# Patient Record
Sex: FEMALE | Race: WHITE | NOT HISPANIC OR LATINO | Employment: OTHER | URBAN - METROPOLITAN AREA
[De-identification: names, ages, dates, MRNs, and addresses within clinical notes are randomized per-mention and may not be internally consistent; named-entity substitution may affect disease eponyms.]

---

## 2021-11-09 ENCOUNTER — OFFICE VISIT (OUTPATIENT)
Dept: SURGERY | Facility: CLINIC | Age: 61
End: 2021-11-09

## 2021-11-09 ENCOUNTER — PREP FOR SURGERY (OUTPATIENT)
Dept: OTHER | Facility: HOSPITAL | Age: 61
End: 2021-11-09

## 2021-11-09 VITALS
WEIGHT: 201 LBS | OXYGEN SATURATION: 99 % | DIASTOLIC BLOOD PRESSURE: 92 MMHG | BODY MASS INDEX: 32.3 KG/M2 | SYSTOLIC BLOOD PRESSURE: 149 MMHG | TEMPERATURE: 98.2 F | HEART RATE: 81 BPM | HEIGHT: 66 IN

## 2021-11-09 DIAGNOSIS — K21.9 GASTROESOPHAGEAL REFLUX DISEASE, UNSPECIFIED WHETHER ESOPHAGITIS PRESENT: Primary | ICD-10-CM

## 2021-11-09 DIAGNOSIS — K21.9 GERD WITHOUT ESOPHAGITIS: Primary | ICD-10-CM

## 2021-11-09 PROCEDURE — 99204 OFFICE O/P NEW MOD 45 MIN: CPT | Performed by: SURGERY

## 2021-11-09 RX ORDER — LANOLIN ALCOHOL/MO/W.PET/CERES
1000 CREAM (GRAM) TOPICAL DAILY
COMMUNITY

## 2021-11-09 RX ORDER — TESTOSTERONE 20.25 MG/1.25G
GEL TOPICAL
COMMUNITY

## 2021-11-09 RX ORDER — GUAIFENESIN/EPHEDRINE HCL 200-12.5MG
TABLET ORAL
COMMUNITY

## 2021-11-09 RX ORDER — LEVOCETIRIZINE DIHYDROCHLORIDE 5 MG/1
TABLET, FILM COATED ORAL
COMMUNITY

## 2021-11-09 RX ORDER — SODIUM CHLORIDE 9 MG/ML
100 INJECTION, SOLUTION INTRAVENOUS CONTINUOUS
Status: CANCELLED | OUTPATIENT
Start: 2021-11-09

## 2021-11-09 RX ORDER — ESZOPICLONE 3 MG/1
3 TABLET, FILM COATED ORAL NIGHTLY
COMMUNITY

## 2021-11-09 RX ORDER — FLUTICASONE PROPIONATE 50 MCG
2 SPRAY, SUSPENSION (ML) NASAL DAILY
COMMUNITY

## 2021-11-09 RX ORDER — AMLODIPINE BESYLATE 10 MG/1
10 TABLET ORAL
COMMUNITY

## 2021-11-09 RX ORDER — LOSARTAN POTASSIUM 100 MG/1
100 TABLET ORAL
COMMUNITY

## 2021-11-09 RX ORDER — POLYETHYLENE GLYCOL 3350 17 G/17G
17 POWDER, FOR SOLUTION ORAL DAILY PRN
COMMUNITY

## 2021-11-09 RX ORDER — LEVOTHYROXINE AND LIOTHYRONINE 57; 13.5 UG/1; UG/1
120 TABLET ORAL
COMMUNITY

## 2021-11-09 RX ORDER — DEXLANSOPRAZOLE 60 MG/1
60 CAPSULE, DELAYED RELEASE ORAL
COMMUNITY

## 2021-11-09 RX ORDER — OMEGA-3-ACID ETHYL ESTERS 1 G/1
4 CAPSULE, LIQUID FILLED ORAL EVERY 24 HOURS
COMMUNITY

## 2021-11-09 RX ORDER — UBIDECARENONE 100 MG
100 CAPSULE ORAL DAILY
COMMUNITY

## 2021-11-09 RX ORDER — TRAZODONE HYDROCHLORIDE 100 MG/1
200 TABLET ORAL NIGHTLY
COMMUNITY

## 2021-11-09 RX ORDER — PROGESTERONE 50 MG/ML
INJECTION, SOLUTION INTRAMUSCULAR EVERY 24 HOURS
COMMUNITY

## 2021-11-09 RX ORDER — GABAPENTIN 100 MG/1
100 CAPSULE ORAL 3 TIMES DAILY PRN
COMMUNITY

## 2021-11-09 RX ORDER — MULTIVIT WITH MINERALS/LUTEIN
1000 TABLET ORAL 2 TIMES DAILY
COMMUNITY

## 2021-11-09 RX ORDER — FUROSEMIDE 20 MG/1
20 TABLET ORAL EVERY 24 HOURS
COMMUNITY

## 2021-11-09 RX ORDER — SUCRALFATE 1 G/1
1 TABLET ORAL 4 TIMES DAILY
COMMUNITY

## 2021-11-09 NOTE — H&P
Subjective   Ginny Dunaway is a 61 y.o. female.     History of present illness  Esmer is a pleasant 61-year-old seen in the office today at the request of Dr. Louis her primary care physician in [place] for symptomatic reflux disease is not being controlled with her current medications.  She has had reflux for 20+ years.  He states I first did her EGD back 25 years ago in [place].  She is currently on Dexilant and it helps but does not completely alleviate her symptoms.  She is considering surgery as a way to be able to get off medication.  Her last scope was done by Dr. Santos at [facility] in January 2021.  There was no mention then of a hiatal hernia.    In the office today we have discussed reflux disease in detail.  We have gone over a booklet outlining reflux and how we treated.  We have also talked about transoral fundoplication and whether she had a hernia or not the differences.  We recommended that she consider repeating an EGD with dilatation to see if she is a candidate for transoral fundoplication.  We have answered all her questions today we have drawn pictures about the TIF.  She wants to proceed with an EGD for further evaluation.    Past Medical History:   Diagnosis Date   • GERD (gastroesophageal reflux disease)        Past Surgical History:   Procedure Laterality Date   • HYSTERECTOMY     • LATERAL EPICONDYLE RELEASE         Outpatient Encounter Medications as of 11/9/2021   Medication Sig Dispense Refill   • 5-Hydroxytryptophan (5-HTP) 100 MG capsule 5-HTP   1 daily     • amLODIPine (NORVASC) 10 MG tablet Take  by mouth.     • Calcium Carb-Cholecalciferol (Calcium 1000 + D) 1000-800 MG-UNIT tablet calcium   600mg - two tablets twice daily     • coenzyme Q10 100 MG capsule Take 100 mg by mouth Daily.     • conjugated estrogens in sodium chloride 0.9 % 50 mL IVPB Daily.     • dexlansoprazole (Dexilant) 60 MG capsule Daily.     • Emollient (DHEA) 1 % cream DHEA   10mg two daily     •  eszopiclone (Lunesta) 3 MG tablet Take 3 mg by mouth Every Night. Take immediately before bedtime     • fluticasone (FLONASE) 50 MCG/ACT nasal spray 2 sprays into the nostril(s) as directed by provider Daily.     • furosemide (Lasix) 20 MG tablet Daily.     • gabapentin (NEURONTIN) 100 MG capsule Take 100 mg by mouth 3 (Three) Times a Day.     • Ibuprofen 800 MG/200ML solution 1 tab(s)     • levocetirizine (Xyzal) 5 MG tablet 1 tab(s)     • losartan (COZAAR) 100 MG tablet Daily.     • Melatonin 10 MG/ML liquid Daily.     • Multiple Vitamins-Minerals (MULTIVITAMIN ADULT EXTRA C PO) multivitamin   one daily     • omega-3 acid ethyl esters (Lovaza) 1 g capsule Daily.     • polyethylene glycol (MiraLax) 17 g packet 17 g     • progesterone oil 50 MG/ML injection Daily.     • sucralfate (CARAFATE) 1 g tablet Take 1 g by mouth 4 (Four) Times a Day.     • Testosterone 1.62 % gel 1 pump(s)     • Thyroid (ARMOUR THYROID) 90 MG PO tablet Daily.     • traZODone (DESYREL) 100 MG tablet Take 100 mg by mouth 2 (Two) Times a Day.     • vitamin B-12 (CYANOCOBALAMIN) 1000 MCG tablet Take 1,000 mcg by mouth 5 (Five) Times a Day.     • vitamin C (ASCORBIC ACID) 250 MG tablet Take 250 mg by mouth Daily.       No facility-administered encounter medications on file as of 11/9/2021.       Allergies   Allergen Reactions   • Bactrim [Sulfamethoxazole-Trimethoprim] Dizziness   • Lisinopril Cough       Family History   Problem Relation Age of Onset   • Cancer Mother    • Hypertension Mother    • Hypertension Father        Social History     Socioeconomic History   • Marital status:    Tobacco Use   • Smoking status: Never Smoker   • Smokeless tobacco: Never Used   Substance and Sexual Activity   • Alcohol use: Defer   • Drug use: Defer   • Sexual activity: Defer       The following portions of the patient's history were reviewed and updated as appropriate: allergies, current medications, past family history, past medical history, past  social history, past surgical history and problem list.    Objective   Complete review of systems is done and unremarkable with exception the chief complaint.    Physical exam shows pleasant 61-year-old female.  HEENT is negative.  Heart regular.  Lungs clear.  Abdomen soft nontender without mass.  Extremities equal range of motion and usage.  Neuro with no obvious focal deficit.    Impression: 61-year-old with 20+ year history of severe reflux disease refractory to medical treatment    Recommendation: EGD with dilatation to assess her suitability for transoral fundoplication    Assessment/Plan   There are no diagnoses linked to this encounter.               Liam Robertson DO  11/9/2021  11:24 EST

## 2021-11-09 NOTE — PROGRESS NOTES
Subjective   Ginny Dunaway is a 61 y.o. female.     History of present illness  Esmer is a pleasant 61-year-old seen in the office today at the request of Dr. Louis her primary care physician in [place] for symptomatic reflux disease is not being controlled with her current medications.  She has had reflux for 20+ years.  He states I first did her EGD back 25 years ago in [place].  She is currently on Dexilant and it helps but does not completely alleviate her symptoms.  She is considering surgery as a way to be able to get off medication.  Her last scope was done by Dr. Santos at [facility] in January 2021.  There was no mention then of a hiatal hernia.    In the office today we have discussed reflux disease in detail.  We have gone over a booklet outlining reflux and how we treated.  We have also talked about transoral fundoplication and whether she had a hernia or not the differences.  We recommended that she consider repeating an EGD with dilatation to see if she is a candidate for transoral fundoplication.  We have answered all her questions today we have drawn pictures about the TIF.  She wants to proceed with an EGD for further evaluation.    Past Medical History:   Diagnosis Date   • GERD (gastroesophageal reflux disease)        Past Surgical History:   Procedure Laterality Date   • HYSTERECTOMY     • LATERAL EPICONDYLE RELEASE         Outpatient Encounter Medications as of 11/9/2021   Medication Sig Dispense Refill   • 5-Hydroxytryptophan (5-HTP) 100 MG capsule 5-HTP   1 daily     • amLODIPine (NORVASC) 10 MG tablet Take  by mouth.     • Calcium Carb-Cholecalciferol (Calcium 1000 + D) 1000-800 MG-UNIT tablet calcium   600mg - two tablets twice daily     • coenzyme Q10 100 MG capsule Take 100 mg by mouth Daily.     • conjugated estrogens in sodium chloride 0.9 % 50 mL IVPB Daily.     • dexlansoprazole (Dexilant) 60 MG capsule Daily.     • Emollient (DHEA) 1 % cream DHEA   10mg two daily     •  eszopiclone (Lunesta) 3 MG tablet Take 3 mg by mouth Every Night. Take immediately before bedtime     • fluticasone (FLONASE) 50 MCG/ACT nasal spray 2 sprays into the nostril(s) as directed by provider Daily.     • furosemide (Lasix) 20 MG tablet Daily.     • gabapentin (NEURONTIN) 100 MG capsule Take 100 mg by mouth 3 (Three) Times a Day.     • Ibuprofen 800 MG/200ML solution 1 tab(s)     • levocetirizine (Xyzal) 5 MG tablet 1 tab(s)     • losartan (COZAAR) 100 MG tablet Daily.     • Melatonin 10 MG/ML liquid Daily.     • Multiple Vitamins-Minerals (MULTIVITAMIN ADULT EXTRA C PO) multivitamin   one daily     • omega-3 acid ethyl esters (Lovaza) 1 g capsule Daily.     • polyethylene glycol (MiraLax) 17 g packet 17 g     • progesterone oil 50 MG/ML injection Daily.     • sucralfate (CARAFATE) 1 g tablet Take 1 g by mouth 4 (Four) Times a Day.     • Testosterone 1.62 % gel 1 pump(s)     • Thyroid (ARMOUR THYROID) 90 MG PO tablet Daily.     • traZODone (DESYREL) 100 MG tablet Take 100 mg by mouth 2 (Two) Times a Day.     • vitamin B-12 (CYANOCOBALAMIN) 1000 MCG tablet Take 1,000 mcg by mouth 5 (Five) Times a Day.     • vitamin C (ASCORBIC ACID) 250 MG tablet Take 250 mg by mouth Daily.       No facility-administered encounter medications on file as of 11/9/2021.       Allergies   Allergen Reactions   • Bactrim [Sulfamethoxazole-Trimethoprim] Dizziness   • Lisinopril Cough       Family History   Problem Relation Age of Onset   • Cancer Mother    • Hypertension Mother    • Hypertension Father        Social History     Socioeconomic History   • Marital status:    Tobacco Use   • Smoking status: Never Smoker   • Smokeless tobacco: Never Used   Substance and Sexual Activity   • Alcohol use: Defer   • Drug use: Defer   • Sexual activity: Defer       The following portions of the patient's history were reviewed and updated as appropriate: allergies, current medications, past family history, past medical history, past  social history, past surgical history and problem list.    Objective   Complete review of systems is done and unremarkable with exception the chief complaint.    Physical exam shows pleasant 61-year-old female.  HEENT is negative.  Heart regular.  Lungs clear.  Abdomen soft nontender without mass.  Extremities equal range of motion and usage.  Neuro with no obvious focal deficit.    Impression: 61-year-old with 20+ year history of severe reflux disease refractory to medical treatment    Recommendation: EGD with dilatation to assess her suitability for transoral fundoplication    Assessment/Plan   There are no diagnoses linked to this encounter.               Liam Robertson DO  11/9/2021  11:21 EST

## 2021-12-06 ENCOUNTER — TELEPHONE (OUTPATIENT)
Dept: SURGERY | Facility: CLINIC | Age: 61
End: 2021-12-06

## 2021-12-06 NOTE — TELEPHONE ENCOUNTER
Caller: RUPESH LEIVA    Relationship to patient: SELF  Best call back number: [phone]    Chief complaint: COVID-19 EXPOSURE    Type of visit: PAT AND EGD    Requested date: NEXT R/S DATE AVAILABLE 14 DAYS OUT.     If rescheduling, when is the original appointment: 12/8/21    Additional notes:PT HAS BEEN EXPOSED TO COVID-19 AND NEEDS TO CANCEL PAT AND WISHES TO RESCHEDULE EGD WITH .

## 2022-01-11 ENCOUNTER — LAB (OUTPATIENT)
Dept: LAB | Facility: HOSPITAL | Age: 62
End: 2022-01-11

## 2022-01-11 DIAGNOSIS — K21.9 GERD WITHOUT ESOPHAGITIS: ICD-10-CM

## 2022-01-11 LAB
ALBUMIN SERPL-MCNC: 4.9 G/DL (ref 3.5–5.2)
ALBUMIN/GLOB SERPL: 2 G/DL
ALP SERPL-CCNC: 66 U/L (ref 39–117)
ALT SERPL W P-5'-P-CCNC: 16 U/L (ref 1–33)
ANION GAP SERPL CALCULATED.3IONS-SCNC: 9.3 MMOL/L (ref 5–15)
AST SERPL-CCNC: 15 U/L (ref 1–32)
BASOPHILS # BLD AUTO: 0.05 10*3/MM3 (ref 0–0.2)
BASOPHILS NFR BLD AUTO: 0.9 % (ref 0–1.5)
BILIRUB SERPL-MCNC: 0.3 MG/DL (ref 0–1.2)
BUN SERPL-MCNC: 19 MG/DL (ref 8–23)
BUN/CREAT SERPL: 20.7 (ref 7–25)
CALCIUM SPEC-SCNC: 9.4 MG/DL (ref 8.6–10.5)
CHLORIDE SERPL-SCNC: 102 MMOL/L (ref 98–107)
CO2 SERPL-SCNC: 29.7 MMOL/L (ref 22–29)
CREAT SERPL-MCNC: 0.92 MG/DL (ref 0.57–1)
DEPRECATED RDW RBC AUTO: 41.3 FL (ref 37–54)
EOSINOPHIL # BLD AUTO: 0 10*3/MM3 (ref 0–0.4)
EOSINOPHIL NFR BLD AUTO: 0 % (ref 0.3–6.2)
ERYTHROCYTE [DISTWIDTH] IN BLOOD BY AUTOMATED COUNT: 13.3 % (ref 12.3–15.4)
GFR SERPL CREATININE-BSD FRML MDRD: 62 ML/MIN/1.73
GLOBULIN UR ELPH-MCNC: 2.4 GM/DL
GLUCOSE SERPL-MCNC: 68 MG/DL (ref 65–99)
HCT VFR BLD AUTO: 40.6 % (ref 34–46.6)
HGB BLD-MCNC: 13.8 G/DL (ref 12–15.9)
IMM GRANULOCYTES # BLD AUTO: 0.03 10*3/MM3 (ref 0–0.05)
IMM GRANULOCYTES NFR BLD AUTO: 0.5 % (ref 0–0.5)
LYMPHOCYTES # BLD AUTO: 1.86 10*3/MM3 (ref 0.7–3.1)
LYMPHOCYTES NFR BLD AUTO: 33.5 % (ref 19.6–45.3)
MCH RBC QN AUTO: 29.3 PG (ref 26.6–33)
MCHC RBC AUTO-ENTMCNC: 34 G/DL (ref 31.5–35.7)
MCV RBC AUTO: 86.2 FL (ref 79–97)
MONOCYTES # BLD AUTO: 0.49 10*3/MM3 (ref 0.1–0.9)
MONOCYTES NFR BLD AUTO: 8.8 % (ref 5–12)
NEUTROPHILS NFR BLD AUTO: 3.12 10*3/MM3 (ref 1.7–7)
NEUTROPHILS NFR BLD AUTO: 56.3 % (ref 42.7–76)
NRBC BLD AUTO-RTO: 0 /100 WBC (ref 0–0.2)
PLATELET # BLD AUTO: 173 10*3/MM3 (ref 140–450)
PMV BLD AUTO: 9.7 FL (ref 6–12)
POTASSIUM SERPL-SCNC: 4.1 MMOL/L (ref 3.5–5.2)
PROT SERPL-MCNC: 7.3 G/DL (ref 6–8.5)
RBC # BLD AUTO: 4.71 10*6/MM3 (ref 3.77–5.28)
SODIUM SERPL-SCNC: 141 MMOL/L (ref 136–145)
WBC NRBC COR # BLD: 5.55 10*3/MM3 (ref 3.4–10.8)

## 2022-01-11 PROCEDURE — 80053 COMPREHEN METABOLIC PANEL: CPT

## 2022-01-11 PROCEDURE — 85025 COMPLETE CBC W/AUTO DIFF WBC: CPT

## 2022-01-11 PROCEDURE — U0004 COV-19 TEST NON-CDC HGH THRU: HCPCS

## 2022-01-11 PROCEDURE — 36415 COLL VENOUS BLD VENIPUNCTURE: CPT

## 2022-01-11 PROCEDURE — U0005 INFEC AGEN DETEC AMPLI PROBE: HCPCS

## 2022-01-11 PROCEDURE — C9803 HOPD COVID-19 SPEC COLLECT: HCPCS

## 2022-01-12 LAB — SARS-COV-2 ORF1AB RESP QL NAA+PROBE: NOT DETECTED

## 2022-01-13 ENCOUNTER — ANESTHESIA (OUTPATIENT)
Dept: GASTROENTEROLOGY | Facility: HOSPITAL | Age: 62
End: 2022-01-13

## 2022-01-13 ENCOUNTER — HOSPITAL ENCOUNTER (OUTPATIENT)
Facility: HOSPITAL | Age: 62
Setting detail: HOSPITAL OUTPATIENT SURGERY
Discharge: HOME | End: 2022-01-13
Attending: SURGERY | Admitting: SURGERY

## 2022-01-13 ENCOUNTER — ANESTHESIA EVENT (OUTPATIENT)
Dept: GASTROENTEROLOGY | Facility: HOSPITAL | Age: 62
End: 2022-01-13

## 2022-01-13 VITALS
RESPIRATION RATE: 16 BRPM | WEIGHT: 214.4 LBS | SYSTOLIC BLOOD PRESSURE: 140 MMHG | BODY MASS INDEX: 34.46 KG/M2 | OXYGEN SATURATION: 94 % | HEIGHT: 66 IN | HEART RATE: 69 BPM | TEMPERATURE: 97.7 F | DIASTOLIC BLOOD PRESSURE: 80 MMHG

## 2022-01-13 DIAGNOSIS — K21.9 GERD WITHOUT ESOPHAGITIS: ICD-10-CM

## 2022-01-13 PROCEDURE — 43235 EGD DIAGNOSTIC BRUSH WASH: CPT | Performed by: SURGERY

## 2022-01-13 PROCEDURE — 43450 DILATE ESOPHAGUS 1/MULT PASS: CPT | Performed by: SURGERY

## 2022-01-13 PROCEDURE — 25010000002 PROPOFOL 200 MG/20ML EMULSION: Performed by: ANESTHESIOLOGY

## 2022-01-13 RX ORDER — PROPOFOL 10 MG/ML
INJECTION, EMULSION INTRAVENOUS AS NEEDED
Status: DISCONTINUED | OUTPATIENT
Start: 2022-01-13 | End: 2022-01-13 | Stop reason: SURG

## 2022-01-13 RX ORDER — SODIUM CHLORIDE, SODIUM LACTATE, POTASSIUM CHLORIDE, CALCIUM CHLORIDE 600; 310; 30; 20 MG/100ML; MG/100ML; MG/100ML; MG/100ML
INJECTION, SOLUTION INTRAVENOUS CONTINUOUS PRN
Status: DISCONTINUED | OUTPATIENT
Start: 2022-01-13 | End: 2022-01-13 | Stop reason: SURG

## 2022-01-13 RX ORDER — LIDOCAINE HYDROCHLORIDE 20 MG/ML
INJECTION, SOLUTION EPIDURAL; INFILTRATION; INTRACAUDAL; PERINEURAL AS NEEDED
Status: DISCONTINUED | OUTPATIENT
Start: 2022-01-13 | End: 2022-01-13 | Stop reason: SURG

## 2022-01-13 RX ORDER — SODIUM CHLORIDE 9 MG/ML
100 INJECTION, SOLUTION INTRAVENOUS CONTINUOUS
Status: DISCONTINUED | OUTPATIENT
Start: 2022-01-13 | End: 2022-01-13 | Stop reason: HOSPADM

## 2022-01-13 RX ORDER — METOCLOPRAMIDE 5 MG/1
TABLET ORAL
Qty: 90 TABLET | Refills: 1 | Status: SHIPPED | OUTPATIENT
Start: 2022-01-13 | End: 2022-05-19

## 2022-01-13 RX ORDER — ONDANSETRON 2 MG/ML
4 INJECTION INTRAMUSCULAR; INTRAVENOUS ONCE AS NEEDED
Status: DISCONTINUED | OUTPATIENT
Start: 2022-01-13 | End: 2022-01-13 | Stop reason: HOSPADM

## 2022-01-13 RX ADMIN — LIDOCAINE HYDROCHLORIDE 100 MG: 20 INJECTION, SOLUTION EPIDURAL; INFILTRATION; INTRACAUDAL; PERINEURAL at 07:30

## 2022-01-13 RX ADMIN — PROPOFOL 100 MG: 10 INJECTION, EMULSION INTRAVENOUS at 07:36

## 2022-01-13 RX ADMIN — PROPOFOL 100 MG: 10 INJECTION, EMULSION INTRAVENOUS at 07:31

## 2022-01-13 RX ADMIN — SODIUM CHLORIDE, SODIUM LACTATE, POTASSIUM CHLORIDE, AND CALCIUM CHLORIDE: .6; .31; .03; .02 INJECTION, SOLUTION INTRAVENOUS at 07:28

## 2022-01-13 RX ADMIN — SODIUM CHLORIDE 100 ML/HR: 0.9 INJECTION, SOLUTION INTRAVENOUS at 06:36

## 2022-01-13 NOTE — OP NOTE
ESOPHAGOGASTRODUODENOSCOPY WITH DILATATION  Procedure Report    Patient Name:  Ginny Hollins  YOB: 1960    Date of Surgery:  1/13/2022     Indications: Severe GE reflux    Pre-op Diagnosis:   GERD without esophagitis [K21.9]       Post-Op Diagnosis Codes:     * GERD without esophagitis [K21.9], gastroparesis with retained food, 2 cm hiatal hernia, severe reflux with irregular Z-line    Procedure/CPT® Codes:      Procedure(s):  ESOPHAGOGASTRODUODENOSCOPY WITH DILATATION (bougie #48,#50,#52,#54,#56,#58)    Staff:  Surgeon(s):  Liam Robertson DO         Anesthesia: General    Anesthetist: Dr. Bibi Robertson    Estimated Blood Loss: none    Implants:    Nothing was implanted during the procedure    Specimen:          None        Findings: Sliding type I hiatal hernia, 2 cm, retained food in the stomach consistent with gastroparesis    Complications: None    Description of Procedure: Ms. Hollins is a pleasant 61-year-old female seen in the office at the request of her primary care physician Dr. Dalila Louis in [place] for lifelong reflux.  It scoped her about 25 years ago and at that time she elected to stay on medication.  She has been on all the proton pump inhibitors.  She is currently on Dexilant which does help her symptoms but she still continues to have regurgitation and reflux issues.  Her last scope was done by Dr. Santos at [facility] in [place] a year ago.  She states that it was essentially unremarkable as she recalls.  She is considering a transoral fundoplication and saw us in the office for more information concerning that I suggested she be rescoped and dilated to make sure she would be a candidate for that procedure.  For that reason she presents today.    Patient was taken to the endoscopy suite procedure room #1 placed in the left lateral decub position and IV sedation given by Dr. Bibi Robertson.  Timeout was done and identity verified.  The Olympus upper Pan  videoscope was passed by the cricopharyngeus into the esophagus stomach and duodenum.  Immediately upon entering the stomach we noted large amount of retained food.  We were able to juliet through that and get into the duodenum.  Duodenum was essentially unremarkable.  Scope was withdrawn back into the stomach and our visualization of the stomach was markedly limited.  We could not rule out the presence of any polyp or tumor.  The wall certainly insufflated and decompressed normally we were able to retroflex the scope and view the EG junction which does show a small 2 cm hernia.  Scope was then withdrawn back to the distal esophagus the Z-line is about 38 to 39 cm from the incisors.  It is irregular consistent with reflux but there is no true Tilley's.  The remainder the esophageal mucosa is unremarkable on the way out.  We then sequentially dilated her from 48-58 Hungarian with Dumont type dilators without difficulty.  She tolerated the procedure well.  She is transferred back to recovery area in satisfactory condition.        Liam Robertson,      Date: 1/13/2022  Time: 07:38 EST

## 2022-01-13 NOTE — ANESTHESIA PREPROCEDURE EVALUATION
Anesthesia Evaluation     Patient summary reviewed and Nursing notes reviewed   no history of anesthetic complications:  NPO Solid Status: > 8 hours  NPO Liquid Status: > 8 hours           Airway   Mallampati: II  TM distance: >3 FB  Neck ROM: full  No difficulty expected  Dental      Pulmonary     breath sounds clear to auscultation  (+) sleep apnea on CPAP,   Cardiovascular     ECG reviewed  Rhythm: regular  Rate: normal    (+) hypertension,       Neuro/Psych- negative ROS  GI/Hepatic/Renal/Endo    (+) obesity,  GERD,      Musculoskeletal (-) negative ROS    Abdominal     Abdomen: soft.   Substance History - negative use     OB/GYN negative ob/gyn ROS         Other - negative ROS                       Anesthesia Plan    ASA 2     MAC     intravenous induction     Anesthetic plan, all risks, benefits, and alternatives have been provided, discussed and informed consent has been obtained with: patient.

## 2022-01-13 NOTE — ANESTHESIA POSTPROCEDURE EVALUATION
Patient: Ginny Hollins    Procedure Summary     Date: 01/13/22 Room / Location: [facility] ENDOSCOPY 1 / [facility] ENDOSCOPY    Anesthesia Start: 0728 Anesthesia Stop: 0739    Procedure: ESOPHAGOGASTRODUODENOSCOPY WITH DILATATION (bougie #48,#50,#52,#54,#56,#58) (N/A ) Diagnosis:       GERD without esophagitis      (GERD without esophagitis [K21.9])    Surgeons: Liam Robertson DO Provider: Bibi Robertson MD    Anesthesia Type: MAC ASA Status: 2          Anesthesia Type: MAC    Vitals  Vitals Value Taken Time   /80 01/13/22 0805   Temp     Pulse 69 01/13/22 0805   Resp 16 01/13/22 0805   SpO2 94 % 01/13/22 0805           Post Anesthesia Care and Evaluation    Patient location during evaluation: PACU  Patient participation: complete - patient participated  Level of consciousness: awake  Pain management: adequate  Airway patency: patent  Anesthetic complications: No anesthetic complications  PONV Status: controlled  Cardiovascular status: acceptable  Respiratory status: acceptable  Hydration status: acceptable

## 2022-01-13 NOTE — DISCHARGE INSTRUCTIONS
A responsible adult should stay with you and you should rest quietly for the rest of the day.    Do not drink alcohol, drive, operate any heavy machinery or power tools or make any legal/important decisions for the next 24 hours.     Progress your diet as tolerated.  If you begin to experience severe pain, increased shortness of breath, racing heartbeat or a fever above 101 F, seek immediate medical attention.     Follow up with MD as instructed. Call office for results in 3 to 5 days if needed.    [phone]

## 2022-01-13 NOTE — H&P
Subjective   Ginny Hollins is a 61 y.o. female.     History of present illness  Ginny is a pleasant 61-year-old seen in the office at the request of her primary care physician Dr. Louis in [place] for severe reflux disease.  She has been bothered with this for 20+ years.  She currently is on Dexilant.  Her last EGD by me was about 25 years ago.  Her last EGD was done by Dr. Santos at [facility] in January last year.  There was no mention of the hiatal hernia.  In the office we discussed reflux disease in detail.  I have recommended that she undergo an EGD with dilatation to see if she is a candidate for a straight transoral fundoplication or whether she needs more intervention than that.  We discussed the procedure and risks.  She understands those accepts those wishes to proceed.    Past Medical History:   Diagnosis Date   • COVID-19    • GERD (gastroesophageal reflux disease)    • Hypertension    • Sleep apnea        Past Surgical History:   Procedure Laterality Date   • HYSTERECTOMY     • LATERAL EPICONDYLE RELEASE         [unfilled]    Allergies   Allergen Reactions   • Bactrim [Sulfamethoxazole-Trimethoprim] Dizziness   • Lisinopril Cough   • Prazosin Other (See Comments)       Family History   Problem Relation Age of Onset   • Cancer Mother    • Hypertension Mother    • Hypertension Father        Social History     Socioeconomic History   • Marital status:    Tobacco Use   • Smoking status: Never Smoker   • Smokeless tobacco: Never Used   Substance and Sexual Activity   • Alcohol use: Yes     Comment: occ   • Drug use: Defer   • Sexual activity: Defer       The following portions of the patient's history were reviewed and updated as appropriate: allergies, current medications, past family history, past medical history, past social history, past surgical history and problem list.    Objective   Complete review of systems was done and unremarkable with exception the chief  complaint.    Physical exam shows a pleasant 61-year-old female.  HEENT is negative.  Heart is regular.  Lungs are clear.  Abdomen is soft and nontender without mass.  Extremities show equal range of motion and usage.  Neuro with no obvious focal deficit.    Impression: Long history of GE reflux disease    Plan: EGD with dilatation    Assessment/Plan                  Liam Robertson, DO  1/13/2022  07:22 EST

## 2022-02-02 ENCOUNTER — OFFICE VISIT (OUTPATIENT)
Dept: SURGERY | Facility: CLINIC | Age: 62
End: 2022-02-02

## 2022-02-02 VITALS
WEIGHT: 216 LBS | OXYGEN SATURATION: 99 % | TEMPERATURE: 96.3 F | SYSTOLIC BLOOD PRESSURE: 151 MMHG | BODY MASS INDEX: 34.72 KG/M2 | HEIGHT: 66 IN | HEART RATE: 81 BPM | DIASTOLIC BLOOD PRESSURE: 93 MMHG

## 2022-02-02 DIAGNOSIS — K21.9 GASTROESOPHAGEAL REFLUX DISEASE WITHOUT ESOPHAGITIS: Primary | ICD-10-CM

## 2022-02-02 PROCEDURE — 99212 OFFICE O/P EST SF 10 MIN: CPT | Performed by: SURGERY

## 2022-02-02 NOTE — PROGRESS NOTES
Subjective   Ginny Hollins is a 61 y.o. female.     History of present illness  Ginny is seen in the office today to discuss the results of her EGD.  We scoped her for a long history of reflux disease.  We found that she has gastroparesis with retained food in the stomach.  We recommended that she try Reglan which after reading the side effects she decided not to do.  She is here today to discuss surgical treatment of her hiatal hernia and reflux.    In the office today we have gone over 3 brochures and drawn her pictures of lap hiatal hernia repair with transoral fundoplication.  We are able to dilate her to 58 Fij[place] easily so she would be a candidate for that.  However I have suggested that she try the Reglan for 2 weeks and her symptoms may be significantly improved.  If not then were happy to get her scheduled for surgery.    Past Medical History:   Diagnosis Date   • COVID-19    • GERD (gastroesophageal reflux disease)    • Hypertension    • Sleep apnea        Past Surgical History:   Procedure Laterality Date   • ENDOSCOPY N/A 1/13/2022    Procedure: ESOPHAGOGASTRODUODENOSCOPY WITH DILATATION (bougie #48,#50,#52,#54,#56,#58);  Surgeon: Liam Robertson DO;  Location: [facility] ENDOSCOPY;  Service: General;  Laterality: N/A;  Post: 2cm hiatal hernia   • HYSTERECTOMY     • LATERAL EPICONDYLE RELEASE         Outpatient Encounter Medications as of 2/2/2022   Medication Sig Dispense Refill   • 5-Hydroxytryptophan (5-HTP) 100 MG capsule 5-HTP   1 daily     • amLODIPine (NORVASC) 10 MG tablet Take  by mouth.     • Calcium Carb-Cholecalciferol (Calcium 1000 + D) 1000-800 MG-UNIT tablet calcium   600mg - two tablets twice daily     • coenzyme Q10 100 MG capsule Take 100 mg by mouth Daily.     • conjugated estrogens in sodium chloride 0.9 % 50 mL IVPB Daily.     • dexlansoprazole (Dexilant) 60 MG capsule Daily.     • DHEA 10 MG capsule Take  by mouth.     • Estradiol-Estriol-Progesterone (BIEST/PROGESTERONE TD)  Place 6 mg on the skin as directed by provider.     • eszopiclone (Lunesta) 3 MG tablet Take 3 mg by mouth Every Night. Take immediately before bedtime     • fluticasone (FLONASE) 50 MCG/ACT nasal spray 2 sprays into the nostril(s) as directed by provider Daily.     • furosemide (Lasix) 20 MG tablet Daily.     • gabapentin (NEURONTIN) 100 MG capsule Take 100 mg by mouth 3 (Three) Times a Day.     • Ibuprofen 800 MG/200ML solution 1 tab(s)     • levocetirizine (Xyzal) 5 MG tablet 1 tab(s)     • losartan (COZAAR) 100 MG tablet Daily.     • Magnesium 100 MG capsule Take  by mouth.     • Melatonin 10 MG/ML liquid Daily.     • metoclopramide (Reglan) 5 MG tablet Take 1 tablet 1/2hr before meals and 1 tablet at bedtime. 90 tablet 1   • Multiple Vitamins-Minerals (MULTIVITAMIN ADULT EXTRA C PO) multivitamin   one daily     • omega-3 acid ethyl esters (Lovaza) 1 g capsule Daily.     • polyethylene glycol (MiraLax) 17 g packet 17 g     • progesterone oil 50 MG/ML injection Daily.     • sucralfate (CARAFATE) 1 g tablet Take 1 g by mouth 4 (Four) Times a Day.     • Testosterone 1.62 % gel 1 pump(s)     • Thyroid (ARMOUR THYROID) 90 MG PO tablet Daily.     • traZODone (DESYREL) 100 MG tablet Take 100 mg by mouth 2 (Two) Times a Day.     • vitamin B-12 (CYANOCOBALAMIN) 1000 MCG tablet Take 1,000 mcg by mouth 5 (Five) Times a Day.     • vitamin C (ASCORBIC ACID) 250 MG tablet Take 250 mg by mouth Daily.     • [DISCONTINUED] Emollient (DHEA) 1 % cream DHEA   10mg two daily       No facility-administered encounter medications on file as of 2/2/2022.       Allergies   Allergen Reactions   • Bactrim [Sulfamethoxazole-Trimethoprim] Dizziness   • Lisinopril Cough   • Prazosin Other (See Comments)       Family History   Problem Relation Age of Onset   • Cancer Mother    • Hypertension Mother    • Hypertension Father        Social History     Socioeconomic History   • Marital status:    Tobacco Use   • Smoking status: Never Smoker    • Smokeless tobacco: Never Used   Substance and Sexual Activity   • Alcohol use: Yes     Comment: occ   • Drug use: Defer   • Sexual activity: Defer       The following portions of the patient's history were reviewed and updated as appropriate: allergies, current medications, past family history, past medical history, past social history, past surgical history and problem list.    Objective       Assessment/Plan   There are no diagnoses linked to this encounter.    Impression: Gastroparesis with 2 cm hernia and reflux    Plan: She will call us with a progress report in 2 weeks.  If she is not significantly improved then we would recommend lap hiatal hernia repair with transoral fundoplication.           Liam Robertson, DO  2/2/2022  13:58 EST

## 2022-02-07 ENCOUNTER — TELEPHONE (OUTPATIENT)
Dept: SURGERY | Facility: CLINIC | Age: 62
End: 2022-02-07

## 2022-02-08 ENCOUNTER — PREP FOR SURGERY (OUTPATIENT)
Dept: OTHER | Facility: HOSPITAL | Age: 62
End: 2022-02-08

## 2022-02-08 DIAGNOSIS — K21.9 GERD (GASTROESOPHAGEAL REFLUX DISEASE): Primary | ICD-10-CM

## 2022-02-08 RX ORDER — SODIUM CHLORIDE 9 MG/ML
100 INJECTION, SOLUTION INTRAVENOUS CONTINUOUS
Status: CANCELLED | OUTPATIENT
Start: 2022-02-08

## 2022-02-08 NOTE — H&P
Subjective   Ginny Hollins is a 61 y.o. female.     History of present illness  Ginny is seen in the office today to discuss the results of her EGD.  We scoped her for a long history of reflux disease.  We found that she has gastroparesis with retained food in the stomach.  We recommended that she try Reglan which after reading the side effects she decided not to do.  She is here today to discuss surgical treatment of her hiatal hernia and reflux.    In the office today we have gone over 3 brochures and drawn her pictures of lap hiatal hernia repair with transoral fundoplication.  We are able to dilate her to 58 Singaporean easily so she would be a candidate for that.  However I have suggested that she try the Reglan for 2 weeks and her symptoms may be significantly improved.  If not then were happy to get her scheduled for surgery.    Past Medical History:   Diagnosis Date   • COVID-19    • GERD (gastroesophageal reflux disease)    • Hypertension    • Sleep apnea        Past Surgical History:   Procedure Laterality Date   • ENDOSCOPY N/A 1/13/2022    Procedure: ESOPHAGOGASTRODUODENOSCOPY WITH DILATATION (bougie #48,#50,#52,#54,#56,#58);  Surgeon: Liam Robertson DO;  Location: [facility] ENDOSCOPY;  Service: General;  Laterality: N/A;  Post: 2cm hiatal hernia   • HYSTERECTOMY     • LATERAL EPICONDYLE RELEASE         Outpatient Encounter Medications as of 2/8/2022   Medication Sig Dispense Refill   • 5-Hydroxytryptophan (5-HTP) 100 MG capsule 5-HTP   1 daily     • amLODIPine (NORVASC) 10 MG tablet Take  by mouth.     • Calcium Carb-Cholecalciferol (Calcium 1000 + D) 1000-800 MG-UNIT tablet calcium   600mg - two tablets twice daily     • coenzyme Q10 100 MG capsule Take 100 mg by mouth Daily.     • conjugated estrogens in sodium chloride 0.9 % 50 mL IVPB Daily.     • dexlansoprazole (Dexilant) 60 MG capsule Daily.     • DHEA 10 MG capsule Take  by mouth.     • Estradiol-Estriol-Progesterone (BIEST/PROGESTERONE TD)  Place 6 mg on the skin as directed by provider.     • eszopiclone (Lunesta) 3 MG tablet Take 3 mg by mouth Every Night. Take immediately before bedtime     • fluticasone (FLONASE) 50 MCG/ACT nasal spray 2 sprays into the nostril(s) as directed by provider Daily.     • furosemide (Lasix) 20 MG tablet Daily.     • gabapentin (NEURONTIN) 100 MG capsule Take 100 mg by mouth 3 (Three) Times a Day.     • Ibuprofen 800 MG/200ML solution 1 tab(s)     • levocetirizine (Xyzal) 5 MG tablet 1 tab(s)     • losartan (COZAAR) 100 MG tablet Daily.     • Magnesium 100 MG capsule Take  by mouth.     • Melatonin 10 MG/ML liquid Daily.     • metoclopramide (Reglan) 5 MG tablet Take 1 tablet 1/2hr before meals and 1 tablet at bedtime. 90 tablet 1   • Multiple Vitamins-Minerals (MULTIVITAMIN ADULT EXTRA C PO) multivitamin   one daily     • omega-3 acid ethyl esters (Lovaza) 1 g capsule Daily.     • polyethylene glycol (MiraLax) 17 g packet 17 g     • progesterone oil 50 MG/ML injection Daily.     • sucralfate (CARAFATE) 1 g tablet Take 1 g by mouth 4 (Four) Times a Day.     • Testosterone 1.62 % gel 1 pump(s)     • Thyroid (ARMOUR THYROID) 90 MG PO tablet Daily.     • traZODone (DESYREL) 100 MG tablet Take 100 mg by mouth 2 (Two) Times a Day.     • vitamin B-12 (CYANOCOBALAMIN) 1000 MCG tablet Take 1,000 mcg by mouth 5 (Five) Times a Day.     • vitamin C (ASCORBIC ACID) 250 MG tablet Take 250 mg by mouth Daily.       No facility-administered encounter medications on file as of 2/8/2022.       Allergies   Allergen Reactions   • Bactrim [Sulfamethoxazole-Trimethoprim] Dizziness   • Lisinopril Cough   • Prazosin Other (See Comments)       Family History   Problem Relation Age of Onset   • Cancer Mother    • Hypertension Mother    • Hypertension Father        Social History     Socioeconomic History   • Marital status:    Tobacco Use   • Smoking status: Never Smoker   • Smokeless tobacco: Never Used   Substance and Sexual Activity   •  Alcohol use: Yes     Comment: occ   • Drug use: Defer   • Sexual activity: Defer       The following portions of the patient's history were reviewed and updated as appropriate: allergies, current medications, past family history, past medical history, past social history, past surgical history and problem list.    Objective       Assessment/Plan   There are no diagnoses linked to this encounter.    Impression: Gastroparesis with 2 cm hernia and reflux    Plan: She will call us with a progress report in 2 weeks.  If she is not significantly improved then we would recommend lap hiatal hernia repair with transoral fundoplication.           Liam Robertson, DO  2/8/2022  08:09 EST

## 2022-03-24 DIAGNOSIS — K21.9 GASTROESOPHAGEAL REFLUX DISEASE WITHOUT ESOPHAGITIS: Primary | ICD-10-CM

## 2022-03-25 RX ORDER — PROGESTERONE 100 MG/1
100 CAPSULE ORAL EVERY OTHER DAY
COMMUNITY

## 2022-03-25 RX ORDER — FLUOROMETHOLONE 0.1 %
1 SUSPENSION, DROPS(FINAL DOSAGE FORM)(ML) OPHTHALMIC (EYE) EVERY 4 HOURS
COMMUNITY

## 2022-03-25 RX ORDER — PHENOL 1.4 %
20 AEROSOL, SPRAY (ML) MUCOUS MEMBRANE
COMMUNITY

## 2022-04-01 ENCOUNTER — LAB (OUTPATIENT)
Dept: LAB | Facility: HOSPITAL | Age: 62
End: 2022-04-01

## 2022-04-01 DIAGNOSIS — K21.9 GASTROESOPHAGEAL REFLUX DISEASE WITHOUT ESOPHAGITIS: ICD-10-CM

## 2022-04-01 DIAGNOSIS — K21.9 GERD WITHOUT ESOPHAGITIS: Primary | ICD-10-CM

## 2022-04-01 LAB
ABO GROUP BLD: NORMAL
BLD GP AB SCN SERPL QL: NEGATIVE
RH BLD: POSITIVE
T&S EXPIRATION DATE: NORMAL

## 2022-04-01 PROCEDURE — 36415 COLL VENOUS BLD VENIPUNCTURE: CPT

## 2022-04-01 PROCEDURE — 86900 BLOOD TYPING SEROLOGIC ABO: CPT

## 2022-04-01 PROCEDURE — 86901 BLOOD TYPING SEROLOGIC RH(D): CPT

## 2022-04-01 PROCEDURE — U0004 COV-19 TEST NON-CDC HGH THRU: HCPCS

## 2022-04-01 PROCEDURE — U0005 INFEC AGEN DETEC AMPLI PROBE: HCPCS

## 2022-04-01 PROCEDURE — 86850 RBC ANTIBODY SCREEN: CPT

## 2022-04-01 PROCEDURE — C9803 HOPD COVID-19 SPEC COLLECT: HCPCS

## 2022-04-02 LAB — SARS-COV-2 ORF1AB RESP QL NAA+PROBE: NOT DETECTED

## 2022-04-04 ENCOUNTER — HOSPITAL ENCOUNTER (OUTPATIENT)
Facility: HOSPITAL | Age: 62
Discharge: HOME | End: 2022-04-05
Attending: SURGERY | Admitting: SURGERY

## 2022-04-04 ENCOUNTER — ANESTHESIA (OUTPATIENT)
Dept: PERIOP | Facility: HOSPITAL | Age: 62
End: 2022-04-04

## 2022-04-04 ENCOUNTER — ANESTHESIA EVENT (OUTPATIENT)
Dept: PERIOP | Facility: HOSPITAL | Age: 62
End: 2022-04-04

## 2022-04-04 DIAGNOSIS — K21.9 GERD (GASTROESOPHAGEAL REFLUX DISEASE): ICD-10-CM

## 2022-04-04 PROCEDURE — 25010000002 FENTANYL CITRATE (PF) 100 MCG/2ML SOLUTION: Performed by: ANESTHESIOLOGIST ASSISTANT

## 2022-04-04 PROCEDURE — A9270 NON-COVERED ITEM OR SERVICE: HCPCS | Performed by: SURGERY

## 2022-04-04 PROCEDURE — 43280 LAPAROSCOPY FUNDOPLASTY: CPT | Performed by: SURGERY

## 2022-04-04 PROCEDURE — G0378 HOSPITAL OBSERVATION PER HR: HCPCS

## 2022-04-04 PROCEDURE — 25010000002 ONDANSETRON PER 1 MG: Performed by: ANESTHESIOLOGIST ASSISTANT

## 2022-04-04 PROCEDURE — 25010000002 DEXAMETHASONE PER 1 MG: Performed by: ANESTHESIOLOGIST ASSISTANT

## 2022-04-04 PROCEDURE — 25010000002 CEFAZOLIN PER 500 MG: Performed by: SURGERY

## 2022-04-04 PROCEDURE — 25010000002 HYDROMORPHONE PER 4 MG: Performed by: ANESTHESIOLOGIST ASSISTANT

## 2022-04-04 PROCEDURE — 63710000001 OXYCODONE 5 MG TABLET: Performed by: SURGERY

## 2022-04-04 PROCEDURE — 63710000001 LIDOCAINE VISCOUS HCL 2 % SOLUTION 15 ML CUP: Performed by: SURGERY

## 2022-04-04 PROCEDURE — 25010000002 PROPOFOL 200 MG/20ML EMULSION: Performed by: ANESTHESIOLOGIST ASSISTANT

## 2022-04-04 PROCEDURE — 25010000002 NEOSTIGMINE 5 MG/5ML SOLUTION: Performed by: ANESTHESIOLOGIST ASSISTANT

## 2022-04-04 PROCEDURE — 25010000002 MIDAZOLAM PER 1 MG: Performed by: ANESTHESIOLOGIST ASSISTANT

## 2022-04-04 PROCEDURE — 25010000002 HYDROMORPHONE 1 MG/ML SOLUTION: Performed by: ANESTHESIOLOGIST ASSISTANT

## 2022-04-04 PROCEDURE — C1889 IMPLANT/INSERT DEVICE, NOC: HCPCS | Performed by: SURGERY

## 2022-04-04 PROCEDURE — 63710000001 ALUMINUM-MAGNESIUM HYDROXIDE-SIMETHICONE 400-400-40 MG/5ML SUSPENSION 30 ML CUP: Performed by: SURGERY

## 2022-04-04 DEVICE — ESOPHYX Z+ FASTENER DELIVERY DEVICE WITH SEROSAFUSE FASTENERS AND ACCESSORIES
Type: IMPLANTABLE DEVICE | Site: ESOPHAGUS | Status: FUNCTIONAL
Brand: ESOPHYX Z+

## 2022-04-04 DEVICE — CARTRIDGE, 20 FASTENERS, 0.010" SLOT, 7.5MM WEB
Type: IMPLANTABLE DEVICE | Site: ESOPHAGUS | Status: FUNCTIONAL
Brand: 7.5MM CARTRIDGE

## 2022-04-04 RX ORDER — DEXAMETHASONE SODIUM PHOSPHATE 4 MG/ML
INJECTION, SOLUTION INTRA-ARTICULAR; INTRALESIONAL; INTRAMUSCULAR; INTRAVENOUS; SOFT TISSUE AS NEEDED
Status: DISCONTINUED | OUTPATIENT
Start: 2022-04-04 | End: 2022-04-04 | Stop reason: SURG

## 2022-04-04 RX ORDER — NEOSTIGMINE METHYLSULFATE 5 MG/5 ML
SYRINGE (ML) INTRAVENOUS AS NEEDED
Status: DISCONTINUED | OUTPATIENT
Start: 2022-04-04 | End: 2022-04-04 | Stop reason: SURG

## 2022-04-04 RX ORDER — SODIUM CHLORIDE 9 MG/ML
100 INJECTION, SOLUTION INTRAVENOUS CONTINUOUS
Status: DISCONTINUED | OUTPATIENT
Start: 2022-04-04 | End: 2022-04-05 | Stop reason: HOSPADM

## 2022-04-04 RX ORDER — ONDANSETRON 2 MG/ML
4 INJECTION INTRAMUSCULAR; INTRAVENOUS EVERY 6 HOURS PRN
Status: DISCONTINUED | OUTPATIENT
Start: 2022-04-04 | End: 2022-04-05 | Stop reason: HOSPADM

## 2022-04-04 RX ORDER — ONDANSETRON 4 MG/1
4 TABLET, FILM COATED ORAL EVERY 6 HOURS PRN
Status: DISCONTINUED | OUTPATIENT
Start: 2022-04-04 | End: 2022-04-05 | Stop reason: HOSPADM

## 2022-04-04 RX ORDER — DROPERIDOL 2.5 MG/ML
0.62 INJECTION, SOLUTION INTRAMUSCULAR; INTRAVENOUS ONCE AS NEEDED
Status: DISCONTINUED | OUTPATIENT
Start: 2022-04-04 | End: 2022-04-04 | Stop reason: HOSPADM

## 2022-04-04 RX ORDER — FAMOTIDINE 10 MG/ML
20 INJECTION, SOLUTION INTRAVENOUS 2 TIMES DAILY
Status: DISCONTINUED | OUTPATIENT
Start: 2022-04-04 | End: 2022-04-05 | Stop reason: HOSPADM

## 2022-04-04 RX ORDER — HYDROCODONE BITARTRATE AND ACETAMINOPHEN 5; 325 MG/1; MG/1
1 TABLET ORAL EVERY 4 HOURS PRN
Status: DISCONTINUED | OUTPATIENT
Start: 2022-04-04 | End: 2022-04-05 | Stop reason: HOSPADM

## 2022-04-04 RX ORDER — HYDROMORPHONE HCL 110MG/55ML
0.5 PATIENT CONTROLLED ANALGESIA SYRINGE INTRAVENOUS
Status: DISCONTINUED | OUTPATIENT
Start: 2022-04-04 | End: 2022-04-05 | Stop reason: HOSPADM

## 2022-04-04 RX ORDER — ONDANSETRON 4 MG/1
4 TABLET, FILM COATED ORAL DAILY PRN
Qty: 30 TABLET | Refills: 1 | Status: SHIPPED | OUTPATIENT
Start: 2022-04-04 | End: 2023-04-04

## 2022-04-04 RX ORDER — ROCURONIUM BROMIDE 10 MG/ML
INJECTION, SOLUTION INTRAVENOUS AS NEEDED
Status: DISCONTINUED | OUTPATIENT
Start: 2022-04-04 | End: 2022-04-04 | Stop reason: SURG

## 2022-04-04 RX ORDER — ONDANSETRON 2 MG/ML
INJECTION INTRAMUSCULAR; INTRAVENOUS AS NEEDED
Status: DISCONTINUED | OUTPATIENT
Start: 2022-04-04 | End: 2022-04-04 | Stop reason: SURG

## 2022-04-04 RX ORDER — NALOXONE HCL 0.4 MG/ML
0.1 VIAL (ML) INJECTION
Status: DISCONTINUED | OUTPATIENT
Start: 2022-04-04 | End: 2022-04-05 | Stop reason: HOSPADM

## 2022-04-04 RX ORDER — PROPOFOL 10 MG/ML
INJECTION, EMULSION INTRAVENOUS AS NEEDED
Status: DISCONTINUED | OUTPATIENT
Start: 2022-04-04 | End: 2022-04-04 | Stop reason: SURG

## 2022-04-04 RX ORDER — FLUMAZENIL 0.1 MG/ML
0.2 INJECTION INTRAVENOUS AS NEEDED
Status: DISCONTINUED | OUTPATIENT
Start: 2022-04-04 | End: 2022-04-04 | Stop reason: HOSPADM

## 2022-04-04 RX ORDER — FENTANYL CITRATE 50 UG/ML
50 INJECTION, SOLUTION INTRAMUSCULAR; INTRAVENOUS
Status: DISCONTINUED | OUTPATIENT
Start: 2022-04-04 | End: 2022-04-04 | Stop reason: HOSPADM

## 2022-04-04 RX ORDER — NALOXONE HCL 0.4 MG/ML
0.4 VIAL (ML) INJECTION
Status: DISCONTINUED | OUTPATIENT
Start: 2022-04-04 | End: 2022-04-05 | Stop reason: HOSPADM

## 2022-04-04 RX ORDER — FENTANYL CITRATE 50 UG/ML
100 INJECTION, SOLUTION INTRAMUSCULAR; INTRAVENOUS
Status: DISCONTINUED | OUTPATIENT
Start: 2022-04-04 | End: 2022-04-04 | Stop reason: HOSPADM

## 2022-04-04 RX ORDER — ACETAMINOPHEN 650 MG/1
650 SUPPOSITORY RECTAL EVERY 4 HOURS PRN
Status: DISCONTINUED | OUTPATIENT
Start: 2022-04-04 | End: 2022-04-05 | Stop reason: HOSPADM

## 2022-04-04 RX ORDER — GLYCOPYRROLATE 1 MG/5 ML
SYRINGE (ML) INTRAVENOUS AS NEEDED
Status: DISCONTINUED | OUTPATIENT
Start: 2022-04-04 | End: 2022-04-04 | Stop reason: SURG

## 2022-04-04 RX ORDER — LIDOCAINE HYDROCHLORIDE 10 MG/ML
INJECTION, SOLUTION EPIDURAL; INFILTRATION; INTRACAUDAL; PERINEURAL AS NEEDED
Status: DISCONTINUED | OUTPATIENT
Start: 2022-04-04 | End: 2022-04-04 | Stop reason: SURG

## 2022-04-04 RX ORDER — HYDROMORPHONE HCL 110MG/55ML
PATIENT CONTROLLED ANALGESIA SYRINGE INTRAVENOUS AS NEEDED
Status: DISCONTINUED | OUTPATIENT
Start: 2022-04-04 | End: 2022-04-04 | Stop reason: SURG

## 2022-04-04 RX ORDER — HYDROCODONE BITARTRATE AND ACETAMINOPHEN 7.5; 325 MG/1; MG/1
1 TABLET ORAL EVERY 6 HOURS PRN
Qty: 30 TABLET | Refills: 0 | Status: SHIPPED | OUTPATIENT
Start: 2022-04-04 | End: 2022-06-30

## 2022-04-04 RX ORDER — PHENYLEPHRINE HCL IN 0.9% NACL 1 MG/10 ML
SYRINGE (ML) INTRAVENOUS AS NEEDED
Status: DISCONTINUED | OUTPATIENT
Start: 2022-04-04 | End: 2022-04-04 | Stop reason: SURG

## 2022-04-04 RX ORDER — FENTANYL CITRATE 50 UG/ML
INJECTION, SOLUTION INTRAMUSCULAR; INTRAVENOUS AS NEEDED
Status: DISCONTINUED | OUTPATIENT
Start: 2022-04-04 | End: 2022-04-04 | Stop reason: SURG

## 2022-04-04 RX ORDER — BUPIVACAINE HYDROCHLORIDE 2.5 MG/ML
INJECTION, SOLUTION EPIDURAL; INFILTRATION; INTRACAUDAL AS NEEDED
Status: DISCONTINUED | OUTPATIENT
Start: 2022-04-04 | End: 2022-04-04 | Stop reason: HOSPADM

## 2022-04-04 RX ORDER — EPHEDRINE SULFATE 5 MG/ML
5 INJECTION INTRAVENOUS ONCE AS NEEDED
Status: DISCONTINUED | OUTPATIENT
Start: 2022-04-04 | End: 2022-04-04 | Stop reason: HOSPADM

## 2022-04-04 RX ORDER — FENTANYL CITRATE 50 UG/ML
25 INJECTION, SOLUTION INTRAMUSCULAR; INTRAVENOUS
Status: DISCONTINUED | OUTPATIENT
Start: 2022-04-04 | End: 2022-04-04 | Stop reason: HOSPADM

## 2022-04-04 RX ORDER — MIDAZOLAM HYDROCHLORIDE 1 MG/ML
INJECTION INTRAMUSCULAR; INTRAVENOUS AS NEEDED
Status: DISCONTINUED | OUTPATIENT
Start: 2022-04-04 | End: 2022-04-04 | Stop reason: SURG

## 2022-04-04 RX ORDER — LABETALOL HYDROCHLORIDE 5 MG/ML
5 INJECTION, SOLUTION INTRAVENOUS
Status: DISCONTINUED | OUTPATIENT
Start: 2022-04-04 | End: 2022-04-04 | Stop reason: HOSPADM

## 2022-04-04 RX ORDER — MORPHINE SULFATE 4 MG/ML
4 INJECTION, SOLUTION INTRAMUSCULAR; INTRAVENOUS
Status: DISCONTINUED | OUTPATIENT
Start: 2022-04-04 | End: 2022-04-05 | Stop reason: HOSPADM

## 2022-04-04 RX ORDER — DIPHENHYDRAMINE HYDROCHLORIDE 50 MG/ML
12.5 INJECTION INTRAMUSCULAR; INTRAVENOUS AS NEEDED
Status: DISCONTINUED | OUTPATIENT
Start: 2022-04-04 | End: 2022-04-04 | Stop reason: HOSPADM

## 2022-04-04 RX ORDER — HYDRALAZINE HYDROCHLORIDE 20 MG/ML
5 INJECTION INTRAMUSCULAR; INTRAVENOUS
Status: DISCONTINUED | OUTPATIENT
Start: 2022-04-04 | End: 2022-04-04 | Stop reason: HOSPADM

## 2022-04-04 RX ORDER — ONDANSETRON 2 MG/ML
4 INJECTION INTRAMUSCULAR; INTRAVENOUS ONCE AS NEEDED
Status: DISCONTINUED | OUTPATIENT
Start: 2022-04-04 | End: 2022-04-04 | Stop reason: HOSPADM

## 2022-04-04 RX ORDER — ACETAMINOPHEN 325 MG/1
650 TABLET ORAL EVERY 4 HOURS PRN
Status: DISCONTINUED | OUTPATIENT
Start: 2022-04-04 | End: 2022-04-05 | Stop reason: HOSPADM

## 2022-04-04 RX ORDER — OXYCODONE HYDROCHLORIDE 5 MG/1
10 TABLET ORAL EVERY 4 HOURS PRN
Status: DISCONTINUED | OUTPATIENT
Start: 2022-04-04 | End: 2022-04-05 | Stop reason: HOSPADM

## 2022-04-04 RX ORDER — NALOXONE HCL 0.4 MG/ML
0.4 VIAL (ML) INJECTION AS NEEDED
Status: DISCONTINUED | OUTPATIENT
Start: 2022-04-04 | End: 2022-04-04 | Stop reason: HOSPADM

## 2022-04-04 RX ORDER — DROPERIDOL 2.5 MG/ML
1.25 INJECTION, SOLUTION INTRAMUSCULAR; INTRAVENOUS ONCE AS NEEDED
Status: DISCONTINUED | OUTPATIENT
Start: 2022-04-04 | End: 2022-04-04 | Stop reason: HOSPADM

## 2022-04-04 RX ADMIN — CEFAZOLIN SODIUM 2 G: 1 INJECTION, POWDER, FOR SOLUTION INTRAMUSCULAR; INTRAVENOUS at 07:39

## 2022-04-04 RX ADMIN — ALUMINA, MAGNESIA, AND SIMETHICONE: 2400; 2400; 240 SUSPENSION ORAL at 14:16

## 2022-04-04 RX ADMIN — ALUMINA, MAGNESIA, AND SIMETHICONE: 2400; 2400; 240 SUSPENSION ORAL at 20:36

## 2022-04-04 RX ADMIN — OXYCODONE 10 MG: 5 TABLET ORAL at 20:36

## 2022-04-04 RX ADMIN — FENTANYL CITRATE 50 MCG: 50 INJECTION INTRAMUSCULAR; INTRAVENOUS at 07:36

## 2022-04-04 RX ADMIN — PROPOFOL 200 MG: 10 INJECTION, EMULSION INTRAVENOUS at 07:37

## 2022-04-04 RX ADMIN — Medication 0.6 MG: at 09:12

## 2022-04-04 RX ADMIN — ONDANSETRON 4 MG: 2 INJECTION INTRAMUSCULAR; INTRAVENOUS at 08:57

## 2022-04-04 RX ADMIN — FAMOTIDINE 20 MG: 10 INJECTION INTRAVENOUS at 14:16

## 2022-04-04 RX ADMIN — CEFAZOLIN SODIUM 1 G: 1 INJECTION, POWDER, FOR SOLUTION INTRAMUSCULAR; INTRAVENOUS at 20:45

## 2022-04-04 RX ADMIN — HYDROMORPHONE HYDROCHLORIDE 1 MG: 2 INJECTION, SOLUTION INTRAMUSCULAR; INTRAVENOUS; SUBCUTANEOUS at 09:24

## 2022-04-04 RX ADMIN — Medication 150 MCG: at 08:53

## 2022-04-04 RX ADMIN — DEXAMETHASONE SODIUM PHOSPHATE 8 MG: 4 INJECTION, SOLUTION INTRAMUSCULAR; INTRAVENOUS at 07:39

## 2022-04-04 RX ADMIN — Medication 150 MCG: at 09:00

## 2022-04-04 RX ADMIN — MIDAZOLAM 2 MG: 1 INJECTION INTRAMUSCULAR; INTRAVENOUS at 07:34

## 2022-04-04 RX ADMIN — ALUMINA, MAGNESIA, AND SIMETHICONE: 2400; 2400; 240 SUSPENSION ORAL at 09:55

## 2022-04-04 RX ADMIN — OXYCODONE 10 MG: 5 TABLET ORAL at 14:16

## 2022-04-04 RX ADMIN — Medication 3 MG: at 09:12

## 2022-04-04 RX ADMIN — SODIUM CHLORIDE 100 ML/HR: 9 INJECTION, SOLUTION INTRAVENOUS at 06:46

## 2022-04-04 RX ADMIN — FENTANYL CITRATE 50 MCG: 50 INJECTION INTRAMUSCULAR; INTRAVENOUS at 08:56

## 2022-04-04 RX ADMIN — CEFAZOLIN SODIUM 1 G: 1 INJECTION, POWDER, FOR SOLUTION INTRAMUSCULAR; INTRAVENOUS at 14:16

## 2022-04-04 RX ADMIN — ROCURONIUM BROMIDE 20 MG: 10 SOLUTION INTRAVENOUS at 08:17

## 2022-04-04 RX ADMIN — LIDOCAINE HYDROCHLORIDE 50 MG: 10 INJECTION, SOLUTION EPIDURAL; INFILTRATION; INTRACAUDAL at 07:36

## 2022-04-04 RX ADMIN — HYDROMORPHONE HYDROCHLORIDE 1 MG: 1 INJECTION, SOLUTION INTRAMUSCULAR; INTRAVENOUS; SUBCUTANEOUS at 09:40

## 2022-04-04 RX ADMIN — ROCURONIUM BROMIDE 50 MG: 10 SOLUTION INTRAVENOUS at 07:37

## 2022-04-04 NOTE — ANESTHESIA POSTPROCEDURE EVALUATION
Patient: Ginny Hollins    Procedure Summary     Date: 04/04/22 Room / Location: [facility] OR  / [facility] MAIN OR    Anesthesia Start: 0733 Anesthesia Stop: 0925    Procedure: HIATAL HERNIA REPAIR LAPAROSCOPIC WITH TRANSORAL INCISIONLESS FUNDOPLICATION (N/A Abdomen) Diagnosis:       GERD (gastroesophageal reflux disease)      (GERD (gastroesophageal reflux disease) [K21.9])    Surgeons: Liam Robertson DO Provider: Erick Archibald MD    Anesthesia Type: general ASA Status: 3          Anesthesia Type: general    Vitals  Vitals Value Taken Time   /89 04/04/22 1050   Temp 98.2 °F (36.8 °C) 04/04/22 0923   Pulse 91 04/04/22 1050   Resp 12 04/04/22 1038   SpO2 97 % 04/04/22 1050   Vitals shown include unvalidated device data.        Post Anesthesia Care and Evaluation    Patient location during evaluation: PACU  Patient participation: complete - patient participated  Level of consciousness: awake  Pain scale: See nurse's notes for pain score.  Pain management: adequate  Airway patency: patent  Anesthetic complications: No anesthetic complications  PONV Status: none  Cardiovascular status: acceptable  Respiratory status: acceptable  Hydration status: acceptable    Comments: Patient seen and examined postoperatively; vital signs stable; SpO2 greater than or equal to 90%; cardiopulmonary status stable; nausea/vomiting adequately controlled; pain adequately controlled; no apparent anesthesia complications; patient discharged from anesthesia care when discharge criteria were met

## 2022-04-04 NOTE — H&P
Subjective   Ginny Hollins is a 61 y.o. female.     History of present illness  Ginny is a pleasant 61-year-old female known to me from previous procedures.  She has a small hiatal hernia with significant reflux disease and some gastroparesis which exacerbates it.  Caroline long discussion about options and she has tried Reglan but because of the potential side effects does not want to stay on Reglan so hopes that repairing her hiatal hernia and creating a new valve will at least help her reflux which is convinced that it will up.  He is undergone an EGD and a dilatation so she is a candidate for transoral fundoplication.  We discussed the procedure and risks in detail.  All questions have been answered.    Past Medical History:   Diagnosis Date   • Abnormal kidney function 03/2022    per pt on lab work.   • Altered bowel habits     diarrhea to constipation   • Ankle edema    • Anxiety    • COVID-19 12/27/2021   • Depression    • Disease of thyroid gland    • Dizziness     believes inner ear (per PCP)   • Dysphagia 03/2022   • GERD (gastroesophageal reflux disease)    • Hiatal hernia 03/2022   • Hypertension    • Sleep apnea        Past Surgical History:   Procedure Laterality Date   • ENDOSCOPY N/A 1/13/2022    Procedure: ESOPHAGOGASTRODUODENOSCOPY WITH DILATATION (bougie #48,#50,#52,#54,#56,#58);  Surgeon: Liam Robertson DO;  Location: [facility] ENDOSCOPY;  Service: General;  Laterality: N/A;  Post: 2cm hiatal hernia   • HYSTERECTOMY     • LATERAL EPICONDYLE RELEASE         [unfilled]    Allergies   Allergen Reactions   • Bactrim [Sulfamethoxazole-Trimethoprim] Dizziness   • Lisinopril Cough   • Prazosin Other (See Comments)       Family History   Problem Relation Age of Onset   • Cancer Mother    • Hypertension Mother    • Hypertension Father        Social History     Socioeconomic History   • Marital status:    Tobacco Use   • Smoking status: Never Smoker   • Smokeless tobacco: Never Used   Substance and  Sexual Activity   • Alcohol use: Yes     Comment: occ   • Drug use: Defer   • Sexual activity: Defer       The following portions of the patient's history were reviewed and updated as appropriate: allergies, current medications, past family history, past medical history, past social history, past surgical history and problem list.    Objective   Complete review of systems is done and unremarkable exception the chief complaint.    Physical exam shows a pleasant 61-year-old female.  HEENT is negative.  Heart is regular.  Lungs are clear.  Abdomen is soft and nontender without mass.  Extremities show equal range of motion and usage.  Neuro with no obvious focal deficit.    Impression: Hiatal hernia with reflux    Plan: Laparoscopic hiatal hernia repair with transoral fundoplication.    Assessment/Plan                  Liam Robertson,   4/4/2022  07:14 EDT

## 2022-04-04 NOTE — ANESTHESIA PREPROCEDURE EVALUATION
Anesthesia Evaluation     Patient summary reviewed and Nursing notes reviewed   NPO Solid Status: > 8 hours  NPO Liquid Status: > 8 hours           Airway   Mallampati: II  TM distance: >3 FB  Neck ROM: full  No difficulty expected  Dental - normal exam     Pulmonary - normal exam   (+) sleep apnea on CPAP,   Cardiovascular - normal exam    (+) hypertension,       Neuro/Psych- negative ROS  GI/Hepatic/Renal/Endo    (+)  hiatal hernia, GERD,  thyroid problem hypothyroidism    Musculoskeletal (-) negative ROS    Abdominal  - normal exam    Bowel sounds: normal.   Substance History - negative use     OB/GYN negative ob/gyn ROS         Other                        Anesthesia Plan    ASA 3     general     intravenous induction     Anesthetic plan, all risks, benefits, and alternatives have been provided, discussed and informed consent has been obtained with: patient.    Plan discussed with CAA.        CODE STATUS:

## 2022-04-04 NOTE — OP NOTE
HIATAL HERNIA REPAIR LAPAROSCOPIC WITH TRANSORAL INCISIONLESS FUNDOPLICATION  Procedure Report    Patient Name:  Ginny Hollins  YOB: 1960    Date of Surgery:  4/4/2022     Indications: Hiatal hernia with reflux, sliding-type 1    Pre-op Diagnosis:   GERD (gastroesophageal reflux disease) [K21.9]       Post-Op Diagnosis Codes:     * GERD (gastroesophageal reflux disease) [K21.9]    Procedure/CPT® Codes:      Procedure(s):  HIATAL HERNIA REPAIR LAPAROSCOPIC WITH TRANSORAL INCISIONLESS FUNDOPLICATION    Staff:  Surgeon(s):  Liam Robertson, Liam Trevino DO    Assistant: Shy Alcala RNFA    Anesthesia: General    Anesthetist: MARIVEL Shah    Estimated Blood Loss: minimal    Implants:    Implant Name Type Inv. Item Serial No.  Lot No. LRB No. Used Action   CARTRDG FASTNR GI SEROSAFUSE 7.5MM EA/20 - [id number] Implant CARTRDG FASTNR GI SEROSAFUSE 7.5MM EA/20  [employer] [id number] N/A 1 Implanted   DEV DS GI FASTNR ESOPHYXZ PLS W/SEROSAFUSE IMP - [id number] Implant DEV DS GI FASTNR ESOPHYXZ PLS W/SEROSAFUSE IMP  [employer] [id number] N/A 1 Implanted       Specimen:          None        Findings: 2 to 3 cm hiatal hernia with reflux    Complications: None    Description of Procedure: Ginny is a pleasant 61-year-old seen in the office with history of GE reflux disease for years.  She also has a small hiatal hernia at this point is wanting to get off medication so we have suggested she consider a laparoscopic hiatal hernia repair with transoral fundoplication.  Discussed the risks in detail.  We have gone over 3 brochures and drawn her pictures as well.  All questions have been answered.    Patient was taken the operating room placed in the supine position.  General is done by Dr Archibald and EVER Darden.  Timeout done identity verified.  She is placed on the pink antislide pad and banana boot lithotomy position with thigh straps  securing her to the table.  Chest strap also secured her to the table.  Abdomen is then prepped and draped after 3-minute drying time.  A left paraumbilical incision is made and varies needle passed through all layers.  Saline drop test is done is negative and the abdomen insufflated with CO2 to approximately 15 mmHg pressure.  Disposable 11 mm trocar and sheath is passed and the laparoscope introduced confirming intra-abdominal positioning with no injury.  The other 4 ports were then placed under direct vision as well as the Iron Intern.  She is placed in steep reverse Trendelenburg position and rolled to the right just a bit and the left lobe of the liver is elevated and secured to the self retainer.  Stomach is then placed on downward traction and the gastrohepatic ligament is opened with the [place] LigaSure device.  This is carried up to near the diaphragm.  The peritoneum overlying the right evan is then scored and the mediastinal plane entered.  The esophagus is dissected from its filmy attachments up into the chest gaining length.  Left evan was then dissected out and then the crura were approximated using the Endo Stitch device.  Total of 3 stitches was required to close down the defect sufficiently.  We then used an AltheaDx suture passer and the fascia at both 11 mm port sites is then approximated using the Endo Stitch device with 0 Vicryl stitch.  All ports were then removed allowing CO2 to escape to the atmosphere proving no bleeding from the port sites themselves.  Fascial stitches are tied down then skin is closed with 4-0 Vicryl in a subcu manner throughout.  Sterile OpSite dressings were applied over Mastisol and Steri-Strips.  She is then taken out of banana boot lithotomy position turned to the left lateral decub position on the blue positioning pillow and secured to the table with wide tape and straps.  Prominences were appropriately padded.  The Olympus upper Pan videoscope was then passed by the  cricopharyngeus into the esophagus stomach and duodenum.  Duodenum was normal.  Scope was withdrawn back into the stomach and stomach inspected.  Stomach walls and plate decompressed normally.  Scope was retroflexed upon itself viewing the EG junction.  EG junction shows the hiatal hernia is now nicely closed down.  The Z-line is then inspected and is found to be at about 40 to 41 cm from the incisors.  There is no Tilley's no ulcer no tumor.  The scope was then withdrawn.  The esophagus is then dilated using Dumont type dilators from 48-58 Guatemalan.  The esophagus to device is then opened and its prepared by lubricating it down its channels and pivot points.  The scope was then lubricated and passed down the device and then under direct vision the scope and the device were passed down the esophagus and on into the stomach.  Scope was retroflexed upon itself viewing the EG junction and beginning at 6:00 2 firings were done 2 firings were done between 7 and 8 to between 9 and 10 to between 10 and 11.  2 firings were then done at 5:00 to between 3 and 4 and 2 between 1 and 2.  The remainder the 20 firings was done between 5 and 7:00 gaining additional length of the valve.  We had a good 2-1/2 a centimeter long valve.  Everything was inspected and looked good and then the scope and the device were removed under direct vision.  We then went back down with the scope to take pictures decompress the stomach of as much fluid and air as possible.  There was no evidence of injury to the stomach or the esophagus on the way out.  She tolerated the procedure well was awakened and transferred to recovery in satisfactory condition.  The final sponge instrument and needle counts were correct.    Assistant: Shy Alcala RNFA  was responsible for performing the following activities: Retraction, Suturing, Closing, Placing Dressing and Held/Positioned Camera and their skilled assistance was necessary for the success of this  case.    Liam Robertson,      Date: 4/4/2022  Time: 09:19 EDT

## 2022-04-04 NOTE — ANESTHESIA PROCEDURE NOTES
Airway  Urgency: elective    Date/Time: 4/4/2022 7:39 AM  Airway not difficult    General Information and Staff    Patient location during procedure: OR  Anesthesiologist: Erick Archibald MD  CRNA: Ariadne Matthew CAA    Indications and Patient Condition  Indications for airway management: airway protection    Preoxygenated: yes  MILS maintained throughout  Mask difficulty assessment: 2 - vent by mask + OA or adjuvant +/- NMBA    Final Airway Details  Final airway type: endotracheal airway      Successful airway: ETT  Cuffed: yes   Successful intubation technique: direct laryngoscopy  Facilitating devices/methods: intubating stylet and cricoid pressure  Endotracheal tube insertion site: oral  Blade: [employer]  Blade size: 3  ETT size (mm): 7.0  Cormack-Lehane Classification: grade I - full view of glottis  Placement verified by: capnometry   Cuff volume (mL): 8  Measured from: lips  ETT/EBT  to lips (cm): 21  Number of attempts at approach: 1  Assessment: lips, teeth, and gum same as pre-op and atraumatic intubation    Additional Comments  Placed by JOHNATHAN Navarro

## 2022-04-05 ENCOUNTER — TELEPHONE (OUTPATIENT)
Dept: SURGERY | Facility: CLINIC | Age: 62
End: 2022-04-05

## 2022-04-05 VITALS
HEIGHT: 66 IN | OXYGEN SATURATION: 94 % | SYSTOLIC BLOOD PRESSURE: 157 MMHG | WEIGHT: 209.6 LBS | TEMPERATURE: 98.3 F | BODY MASS INDEX: 33.68 KG/M2 | HEART RATE: 87 BPM | RESPIRATION RATE: 15 BRPM | DIASTOLIC BLOOD PRESSURE: 98 MMHG

## 2022-04-05 PROCEDURE — 63710000001 OXYCODONE 5 MG TABLET: Performed by: SURGERY

## 2022-04-05 PROCEDURE — G0378 HOSPITAL OBSERVATION PER HR: HCPCS

## 2022-04-05 PROCEDURE — A9270 NON-COVERED ITEM OR SERVICE: HCPCS | Performed by: SURGERY

## 2022-04-05 PROCEDURE — 63710000001 ALUMINUM-MAGNESIUM HYDROXIDE-SIMETHICONE 400-400-40 MG/5ML SUSPENSION 30 ML CUP: Performed by: SURGERY

## 2022-04-05 PROCEDURE — 63710000001 ACETAMINOPHEN 325 MG TABLET: Performed by: SURGERY

## 2022-04-05 PROCEDURE — 63710000001 LIDOCAINE VISCOUS HCL 2 % SOLUTION 15 ML CUP: Performed by: SURGERY

## 2022-04-05 RX ADMIN — OXYCODONE 10 MG: 5 TABLET ORAL at 05:48

## 2022-04-05 RX ADMIN — OXYCODONE 10 MG: 5 TABLET ORAL at 09:59

## 2022-04-05 RX ADMIN — FAMOTIDINE 20 MG: 10 INJECTION INTRAVENOUS at 08:33

## 2022-04-05 RX ADMIN — FAMOTIDINE 20 MG: 10 INJECTION INTRAVENOUS at 01:37

## 2022-04-05 RX ADMIN — ACETAMINOPHEN 650 MG: 325 TABLET ORAL at 01:37

## 2022-04-05 RX ADMIN — ALUMINA, MAGNESIA, AND SIMETHICONE: 2400; 2400; 240 SUSPENSION ORAL at 09:15

## 2022-04-05 NOTE — PROGRESS NOTES
LOS: 0 days   Patient Care Team:  Melany Huang MD as PCP - General (Family Medicine)    Reason for follow-up: Postop    Subjective   Patient seen and examined.  No specific complaint other than some soreness.  No nausea or vomiting    Objective   Incisions are clean dry and intact    Vital Signs  Vitals:    04/04/22 2059 04/05/22 0100 04/05/22 0448 04/05/22 0756   BP: 152/84 156/83 163/90 157/98   BP Location: Right arm Right arm Left arm Left arm   Patient Position: Lying Lying Sitting Lying   Pulse: 79 73 76 87   Resp: 16 18 16 15   Temp: 98.6 °F (37 °C) 98.5 °F (36.9 °C) 97.9 °F (36.6 °C) 98.3 °F (36.8 °C)   TempSrc: Oral Oral Oral Oral   SpO2: 93% 92% 95% 94%   Weight:       Height:             Results Review:       Lab Results (last 24 hours)     ** No results found for the last 24 hours. **           Imaging Results (Last 24 Hours)     ** No results found for the last 24 hours. **          Medication Review:     Assessment/Plan         GERD (gastroesophageal reflux disease)    Impression: Postop day 1 lap hiatal hernia repair with transoral fundoplication    Plan: Doing well so we will let her go home later today on full liquid diet          Liam Robertson DO  04/05/22  08:18 EDT

## 2022-04-05 NOTE — DISCHARGE SUMMARY
Date of Discharge:  4/5/2022    Admitting Diagnosis: Hiatal hernia with reflux    Discharge Diagnosis: Same    Presenting Problem/History of Present Illness  Active Hospital Problems    Diagnosis  POA   • **GERD (gastroesophageal reflux disease) [K21.9]  Unknown      Resolved Hospital Problems   No resolved problems to display.          Hospital Course  Patient is a 61 y.o. female presented with hiatal hernia with reflux.  She underwent a laparoscopic hiatal hernia repair with transoral fundoplication.      Procedures Performed    Procedure(s):  HIATAL HERNIA REPAIR LAPAROSCOPIC WITH TRANSORAL INCISIONLESS FUNDOPLICATION  -------------------       Consults:   Consults     No orders found for last 30 day(s).          Pertinent Test Results:     Condition on Discharge: Stable    Vital Signs  Temp:  [97.5 °F (36.4 °C)-98.6 °F (37 °C)] 98.3 °F (36.8 °C)  Heart Rate:  [69-96] 87  Resp:  [12-18] 15  BP: (115-163)/(71-98) 157/98    Physical Exam:   Physical Exam    Discharge Disposition  Home or Self Care    Discharge Medications     Discharge Medications      New Medications      Instructions Start Date   HYDROcodone-acetaminophen 7.5-325 MG per tablet  Commonly known as: Norco   1 tablet, Oral, Every 6 Hours PRN      ondansetron 4 MG tablet  Commonly known as: Zofran   4 mg, Oral, Daily PRN         Continue These Medications      Instructions Start Date   5- MG capsule   5-HTP   1 daily      amLODIPine 10 MG tablet  Commonly known as: NORVASC   10 mg, Oral, Every Night at Bedtime      BIEST/PROGESTERONE TD   6 mg, Transdermal      Calcium 1000 + D 1000-800 MG-UNIT tablet  Generic drug: Calcium Carb-Cholecalciferol   calcium   600mg - two tablets twice daily      coenzyme Q10 100 MG capsule   100 mg, Oral, Daily      conjugated estrogens in sodium chloride 0.9 % 50 mL IVPB   Every 24 Hours      Dexilant 60 MG capsule  Generic drug: dexlansoprazole   60 mg, Oral, Every Early Morning      DHEA 10 MG capsule    Oral      eszopiclone 3 MG tablet  Commonly known as: LUNESTA   3 mg, Oral, Nightly, Take immediately before bedtime       fluorometholone 0.1 % ophthalmic suspension  Commonly known as: FML   1 drop, Every 4 Hours      fluticasone 50 MCG/ACT nasal spray  Commonly known as: FLONASE   2 sprays, Nasal, Daily      furosemide 20 MG tablet  Commonly known as: LASIX   20 mg, Oral, Every 24 Hours      gabapentin 100 MG capsule  Commonly known as: NEURONTIN   100 mg, Oral, 3 Times Daily PRN      Ibuprofen 800 MG/200ML solution   1 tab(s)      levocetirizine 5 MG tablet  Commonly known as: XYZAL   1 tab(s)      Lifitegrast 5 % ophthalmic solution  Commonly known as: XIIDRA   1 drop, Both Eyes, 2 Times Daily      losartan 100 MG tablet  Commonly known as: COZAAR   100 mg, Oral, Every Night at Bedtime      Magnesium 100 MG capsule   400 mg, Oral, Daily      Melatonin 10 MG/ML liquid   Every 24 Hours      Melatonin 10 MG tablet   20 mg, Oral, Every Night at Bedtime      metoclopramide 5 MG tablet  Commonly known as: Reglan   Take 1 tablet 1/2hr before meals and 1 tablet at bedtime.      MULTIVITAMIN ADULT EXTRA C PO   multivitamin   one daily      omega-3 acid ethyl esters 1 g capsule  Commonly known as: LOVAZA   4 g, Oral, Every 24 Hours      polyethylene glycol 17 g packet  Commonly known as: MIRALAX   17 g, Oral, Daily PRN      progesterone oil 50 MG/ML injection   Every 24 Hours      Progesterone 100 MG capsule  Commonly known as: PROMETRIUM   100 mg, Oral, Every Other Day, Take QHs      sucralfate 1 g tablet  Commonly known as: CARAFATE   1 g, Oral, 4 Times Daily      Testosterone 1.62 % gel   1 pump(s)      Thyroid 90 MG tablet  Commonly known as: ARMOUR   90 mg, Oral, Every Early Morning      traZODone 100 MG tablet  Commonly known as: DESYREL   200 mg, Oral, Nightly      Froylan Base wax   Does not apply, Daily      vitamin B-12 1000 MCG tablet  Commonly known as: CYANOCOBALAMIN   1,000 mcg, Oral, Daily      vitamin C  250 MG tablet  Commonly known as: ASCORBIC ACID   1,000 mg, Oral, 2 Times Daily             Discharge Diet:   Diet Instructions     Advance Diet As Tolerated            Activity at Discharge:   Activity Instructions     Bathing Restrictions      You may shower but do not sit with your incisions submerged underwater in a tub    Type of Restriction: Bathing    Bathing Restrictions: No Tub Bath    Driving Restrictions      Type of Restriction: Driving    Driving Restrictions: No Driving While Taking Narcotics    Gradually Increase Activity Until at Pre-Hospitalization Level            Follow-up Appointments  No future appointments.  Additional Instructions for the Follow-ups that You Need to Schedule     Discharge Follow-up with Specified Provider: Dr. Robertson; 2 Weeks   As directed      To: Dr. Robertson    Follow Up: 2 Weeks               Test Results Pending at Discharge       Liam Robertson DO  04/05/22  08:22 EDT

## 2022-04-05 NOTE — DISCHARGE INSTR - ACTIVITY
Gradually increase activity until pre-hospital levels have been met  No tub baths, hot tubs, or swimming pools until incisions have healed.  No driving while on narcotics

## 2022-04-05 NOTE — PLAN OF CARE
Goal Outcome Evaluation:           Progress: no change       Patient is alert and oriented X4. She has been up ad genoveva to the bathroom and taking laps in the hallway. She is determined to stay active and decrease the gas pain. She stated while doing the IS that her gas pain level decreased.   Care plan is ongoing.

## 2022-04-06 ENCOUNTER — TELEPHONE (OUTPATIENT)
Dept: SURGERY | Facility: CLINIC | Age: 62
End: 2022-04-06

## 2022-04-06 NOTE — TELEPHONE ENCOUNTER
Pt called and said the pain meds she was given after going home from surgery is not helping her pain.  She wants to know if there's anything else that can be called in.  She can't take ibuprofen, but states she has taken Tramadol before. kp

## 2022-04-06 NOTE — CASE MANAGEMENT/SOCIAL WORK
Case Management Discharge Note      Final Note: home         Selected Continued Care - Discharged on 4/5/2022 Admission date: 4/4/2022 - Discharge disposition: Home or Self Care            Transportation Services  Private: Car    Final Discharge Disposition Code: 01 - home or self-care

## 2022-04-07 ENCOUNTER — TELEPHONE (OUTPATIENT)
Dept: SURGERY | Facility: CLINIC | Age: 62
End: 2022-04-07

## 2022-04-27 ENCOUNTER — OFFICE VISIT (OUTPATIENT)
Dept: SURGERY | Facility: CLINIC | Age: 62
End: 2022-04-27

## 2022-04-27 VITALS
SYSTOLIC BLOOD PRESSURE: 139 MMHG | HEIGHT: 66 IN | BODY MASS INDEX: 32.95 KG/M2 | WEIGHT: 205 LBS | TEMPERATURE: 96.8 F | HEART RATE: 83 BPM | OXYGEN SATURATION: 97 % | DIASTOLIC BLOOD PRESSURE: 86 MMHG

## 2022-04-27 DIAGNOSIS — K21.9 GASTROESOPHAGEAL REFLUX DISEASE, UNSPECIFIED WHETHER ESOPHAGITIS PRESENT: Primary | ICD-10-CM

## 2022-04-27 PROCEDURE — 99024 POSTOP FOLLOW-UP VISIT: CPT | Performed by: SURGERY

## 2022-04-27 NOTE — PROGRESS NOTES
SUBJECTIVE:    Ginny  is seen in the office today follow-up from her lap hiatal hernia repair with transoral fundoplication 3 weeks ago.  In general she is doing well.  No nausea or vomiting.  Occasionally will get some food it feels like it sticks a little bit.    OBJECTIVE:    Incisions are healing appropriately without infection    ASSESSMENT:    Satisfactory postop progress    PLAN:    Recheck in the office in 3 weeks.  She may go to every other day on her Dexilant.  Not to lift push or pull more than 15 pounds for another couple of weeks.  She may begin  soft foods as we have outlined today

## 2022-05-11 ENCOUNTER — TELEPHONE (OUTPATIENT)
Dept: SURGERY | Facility: CLINIC | Age: 62
End: 2022-05-11

## 2022-05-11 NOTE — TELEPHONE ENCOUNTER
Pt called and said she was having a bit of acid reflux since increasing her diet, told her to try some tums or Rolaids until she see's Dr. Robertson next week.  She understood. andrei

## 2022-05-19 ENCOUNTER — OFFICE VISIT (OUTPATIENT)
Dept: SURGERY | Facility: CLINIC | Age: 62
End: 2022-05-19

## 2022-05-19 VITALS
OXYGEN SATURATION: 98 % | DIASTOLIC BLOOD PRESSURE: 92 MMHG | HEIGHT: 66 IN | HEART RATE: 87 BPM | SYSTOLIC BLOOD PRESSURE: 144 MMHG | WEIGHT: 202 LBS | BODY MASS INDEX: 32.47 KG/M2 | TEMPERATURE: 98.4 F

## 2022-05-19 DIAGNOSIS — K44.9 HIATAL HERNIA WITH GASTROESOPHAGEAL REFLUX: Primary | ICD-10-CM

## 2022-05-19 DIAGNOSIS — K21.9 HIATAL HERNIA WITH GASTROESOPHAGEAL REFLUX: Primary | ICD-10-CM

## 2022-05-19 PROCEDURE — 99024 POSTOP FOLLOW-UP VISIT: CPT | Performed by: SURGERY

## 2022-05-19 NOTE — PROGRESS NOTES
SUBJECTIVE:    Ginny is seen in the office today in follow-up from her lap hiatal hernia repair with transoral fundoplication about 6 weeks ago.  In general she is doing okay.  She has had some episodes of reflux she thinks.    OBJECTIVE:    Incisions have healed well    ASSESSMENT:    Slow progress in healing.  Suspect she may have component of gastroparesis as well    PLAN:    Continue to try to wean her PPIs.  Continue to eat multiple small meals daily.  We may consider adding erythromycin in the future and/or Reglan.  Recheck in the office in 1 month

## 2022-06-30 ENCOUNTER — OFFICE VISIT (OUTPATIENT)
Dept: SURGERY | Facility: CLINIC | Age: 62
End: 2022-06-30

## 2022-06-30 VITALS
OXYGEN SATURATION: 98 % | DIASTOLIC BLOOD PRESSURE: 89 MMHG | HEART RATE: 77 BPM | HEIGHT: 66 IN | SYSTOLIC BLOOD PRESSURE: 151 MMHG | BODY MASS INDEX: 32.47 KG/M2 | TEMPERATURE: 97.3 F | WEIGHT: 202 LBS

## 2022-06-30 DIAGNOSIS — K21.9 HIATAL HERNIA WITH GASTROESOPHAGEAL REFLUX: Primary | ICD-10-CM

## 2022-06-30 DIAGNOSIS — K44.9 HIATAL HERNIA WITH GASTROESOPHAGEAL REFLUX: Primary | ICD-10-CM

## 2022-06-30 PROCEDURE — 99024 POSTOP FOLLOW-UP VISIT: CPT | Performed by: SURGERY

## 2022-06-30 RX ORDER — BUSPIRONE HYDROCHLORIDE 10 MG/1
10 TABLET ORAL 3 TIMES DAILY
COMMUNITY

## 2022-06-30 NOTE — PROGRESS NOTES
SUBJECTIVE:    Ginny is a pleasant 62-year-old seen in the office today follow-up from her lap hiatal hernia repair with transoral fundoplication 12 weeks ago.  States she still having some issues.  She is did see her GI doc in [place] who thinks she may have  small bowel bacterial overgrowth.  He plans to evaluate that further.    OBJECTIVE:    Lesions have healed well    ASSESSMENT:    In general she is improved from before surgery but with her persistent issues I told her I would suggest she consider a Bravo study.        PLAN:    I have asked that she keep us in the loop after she gets for further studies done if indeed she is refluxing some then I have explained she would either need to convert to a Nissen or she would have to go on erythromycin daily or Reglan to help with the reflux

## (undated) DEVICE — 40580 - THE PINK PAD - ADVANCED TRENDELENBURG POSITIONING KIT: Brand: 40580 - THE PINK PAD - ADVANCED TRENDELENBURG POSITIONING KIT

## (undated) DEVICE — GENERAL LAPAROSCOPY CDS: Brand: MEDLINE INDUSTRIES, INC.

## (undated) DEVICE — SUTURING DEVICE: Brand: ENDO STITCH

## (undated) DEVICE — PK ENDO GI 50

## (undated) DEVICE — POSTN ARM CRDL LAMIN PK/2

## (undated) DEVICE — ENDOPATH 5MM CURVED SCISSORS WITH MONOPOLAR CAUTERY: Brand: ENDOPATH

## (undated) DEVICE — BITEBLOCK ENDO W/STRAP 60F A/ LF DISP

## (undated) DEVICE — UNDYED BRAIDED (POLYGLACTIN 910), SYNTHETIC ABSORBABLE SUTURE: Brand: COATED VICRYL

## (undated) DEVICE — 9165 UNIVERSAL PATIENT PLATE: Brand: 3M™

## (undated) DEVICE — SOLUTION,WATER,IRRIGATION,1000ML,STERILE: Brand: MEDLINE

## (undated) DEVICE — DRAPE, LAVH, STERILE: Brand: MEDLINE

## (undated) DEVICE — ENDOPATH PNEUMONEEDLE INSUFFLATION NEEDLES WITH LUER LOCK CONNECTORS 120MM: Brand: ENDOPATH

## (undated) DEVICE — GLV SURG BIOGEL SENSR LTX PF SZ7.5

## (undated) DEVICE — PETROLATUM GAUZE CISION DRESSING: Brand: VASELINE

## (undated) DEVICE — ENDOPATH XCEL DILATING TIP TROCARS WITH STABILITY SLEEVES: Brand: ENDOPATH XCEL

## (undated) DEVICE — POLYESTER SINGLE STITCH RELOAD: Brand: SURGIDAC

## (undated) DEVICE — 3M™ STERI-STRIP™ REINFORCED ADHESIVE SKIN CLOSURES, R1547, 1/2 IN X 4 IN (12 MM X 100 MM), 6 STRIPS/ENVELOPE: Brand: 3M™ STERI-STRIP™

## (undated) DEVICE — TOTAL TRAY, DB, 100% SILI FOLEY, 16FR 10: Brand: MEDLINE

## (undated) DEVICE — MARYLAND JAW LAPAROSCOPIC SEALER/DIVIDER COATED: Brand: LIGASURE

## (undated) DEVICE — SLV SCD CALF HEMOFORCE DVT THERP REPROC MD

## (undated) DEVICE — DRSNG SURESITE WNDW 2.38X2.75

## (undated) DEVICE — SPNG GZ AVANT 6PLY 4X4IN STRL PK/2

## (undated) DEVICE — SUT VIC 0 SUTUPAK TIES 18IN J906G

## (undated) DEVICE — SUT VIC FS2 4/0 27IN J422H

## (undated) DEVICE — LAPAROSCOPIC GAS CONDITIONING DEVICE.: Brand: INSUFLOW

## (undated) DEVICE — SYR LL TP 10ML STRL

## (undated) DEVICE — ENDOPATH XCEL WITH OPTIVIEW TECHNOLOGY UNIVERSAL TROCAR STABILITY SLEEVES: Brand: ENDOPATH XCEL OPTIVIEW

## (undated) DEVICE — KT SURG TURNOVER 050